# Patient Record
Sex: FEMALE | Race: WHITE | URBAN - METROPOLITAN AREA
[De-identification: names, ages, dates, MRNs, and addresses within clinical notes are randomized per-mention and may not be internally consistent; named-entity substitution may affect disease eponyms.]

---

## 2023-01-19 ENCOUNTER — WEB ENCOUNTER (OUTPATIENT)
Dept: URBAN - METROPOLITAN AREA CLINIC 19 | Facility: CLINIC | Age: 53
End: 2023-01-19

## 2023-01-19 ENCOUNTER — LAB OUTSIDE AN ENCOUNTER (OUTPATIENT)
Dept: URBAN - METROPOLITAN AREA CLINIC 19 | Facility: CLINIC | Age: 53
End: 2023-01-19

## 2023-01-19 ENCOUNTER — OFFICE VISIT (OUTPATIENT)
Dept: URBAN - METROPOLITAN AREA CLINIC 19 | Facility: CLINIC | Age: 53
End: 2023-01-19
Payer: COMMERCIAL

## 2023-01-19 VITALS
TEMPERATURE: 98.7 F | HEART RATE: 91 BPM | DIASTOLIC BLOOD PRESSURE: 76 MMHG | HEIGHT: 66 IN | WEIGHT: 205 LBS | BODY MASS INDEX: 32.95 KG/M2 | SYSTOLIC BLOOD PRESSURE: 128 MMHG

## 2023-01-19 DIAGNOSIS — R32 UNSPECIFIED URINARY INCONTINENCE: ICD-10-CM

## 2023-01-19 DIAGNOSIS — K58.0 IRRITABLE BOWEL SYNDROME WITH DIARRHEA: ICD-10-CM

## 2023-01-19 DIAGNOSIS — R15.9 FULL INCONTINENCE OF FECES: ICD-10-CM

## 2023-01-19 DIAGNOSIS — G45.8 ACUTE ANTERIOR CIRCULATION TIA: ICD-10-CM

## 2023-01-19 DIAGNOSIS — R13.19 ESOPHAGEAL DYSPHAGIA: ICD-10-CM

## 2023-01-19 DIAGNOSIS — K52.9 CHRONIC DIARRHEA: ICD-10-CM

## 2023-01-19 DIAGNOSIS — R19.8 GLOBUS SENSATION: ICD-10-CM

## 2023-01-19 PROCEDURE — 99204 OFFICE O/P NEW MOD 45 MIN: CPT | Performed by: STUDENT IN AN ORGANIZED HEALTH CARE EDUCATION/TRAINING PROGRAM

## 2023-01-19 NOTE — HPI-TODAY'S VISIT:
1/19/2023:  Judit: The pt is a 31 yo F who presents for symptoms of dysphagia and IBS-D.  Sx onset and duration:  "All her life".  Progressively worse in the last couple of years.  RUQ pain which is worse when she eats radiating laterally from the epigastric region to the ruq diagnally, which has been ongoing since her hernia repair. Dr. Hammond from [facility] did her hiatal hernia repair.  Has seen Dr. Rivers from [facility] previously for this, when the pain started and was told it wasn't related to hernia.  Also has IBS-D.  Antispasmodic was given and typically do not help.  Had another meds that she cannot swallow, so that she is not taking them.  Has also had to take 8-12 loperomide previously for the diarrhea.   Additional sx:  Fecal and urinary urgency.  Denies melena or hematochezia. Types of food:  Water and mashed potatoes get stuck often she reports.   ER visits for this: Not recently but multiple times for food impactions as a child. Symptoms of hay-fever or seasonal allergies or asthma: None. Smoking hx:  None Family members with similar issues: Adopted but she does not know the family history very well.    Other family hx:  Biological sister is the only one she knows of and she has had a h/o strokes.    Other concerns today:  On plavix for multiple TIAs.  Last TIA was a year ago.  Still has the gallbladder.  She has seen Dr. Lama in [place], Dr. Rivers in [place], [place].   present with her and provides supplemental hx.  Involved in her care.

## 2023-01-20 ENCOUNTER — WEB ENCOUNTER (OUTPATIENT)
Dept: URBAN - METROPOLITAN AREA CLINIC 19 | Facility: CLINIC | Age: 53
End: 2023-01-20

## 2023-01-20 ENCOUNTER — TELEPHONE ENCOUNTER (OUTPATIENT)
Dept: URBAN - METROPOLITAN AREA CLINIC 19 | Facility: CLINIC | Age: 53
End: 2023-01-20

## 2023-01-20 PROBLEM — 236071009: Status: ACTIVE | Noted: 2023-01-20

## 2023-01-20 PROBLEM — 78459008: Status: ACTIVE | Noted: 2023-01-20

## 2023-01-20 PROBLEM — 197125005: Status: ACTIVE | Noted: 2023-01-20

## 2023-01-20 PROBLEM — 1086911000119107: Status: ACTIVE | Noted: 2023-01-20

## 2023-01-20 PROBLEM — 267103008: Status: ACTIVE | Noted: 2023-01-20

## 2023-01-20 LAB
HS CRP: 3.2
IMMUNOGLOBULIN A: 340
INTERPRETATION: (no result)
SED RATE BY MODIFIED: 9
TISSUE TRANSGLUTAMINASE AB, IGA: <1
TSH W/REFLEX TO FT4: 2.1

## 2023-01-22 ENCOUNTER — TELEPHONE ENCOUNTER (OUTPATIENT)
Dept: URBAN - METROPOLITAN AREA CLINIC 19 | Facility: CLINIC | Age: 53
End: 2023-01-22

## 2023-01-26 ENCOUNTER — WEB ENCOUNTER (OUTPATIENT)
Dept: URBAN - METROPOLITAN AREA CLINIC 19 | Facility: CLINIC | Age: 53
End: 2023-01-26

## 2023-01-27 ENCOUNTER — WEB ENCOUNTER (OUTPATIENT)
Dept: URBAN - METROPOLITAN AREA CLINIC 19 | Facility: CLINIC | Age: 53
End: 2023-01-27

## 2023-01-27 RX ORDER — DIPHENOXYLATE HYDROCHLORIDE AND ATROPINE SULFATE 2.5; .025 MG/1; MG/1
1 TABLET AS NEEDED TABLET ORAL
Qty: 60 TABLET | Refills: 2 | OUTPATIENT
Start: 2023-01-30 | End: 2023-04-30

## 2023-01-27 RX ORDER — CHOLESTYRAMINE 4 G/9G
1 PACKET MIXED WITH WATER OR NON-CARBONATED DRINK POWDER, FOR SUSPENSION ORAL ONCE A DAY
Qty: 30 | Refills: 1 | OUTPATIENT
Start: 2023-01-30

## 2023-02-02 ENCOUNTER — TELEPHONE ENCOUNTER (OUTPATIENT)
Dept: URBAN - METROPOLITAN AREA CLINIC 96 | Facility: CLINIC | Age: 53
End: 2023-02-02

## 2023-03-02 PROBLEM — 40890009: Status: ACTIVE | Noted: 2023-01-20

## 2023-03-22 ENCOUNTER — OFFICE VISIT (OUTPATIENT)
Dept: URBAN - METROPOLITAN AREA SURGERY CENTER 31 | Facility: SURGERY CENTER | Age: 53
End: 2023-03-22

## 2023-03-22 RX ORDER — DIPHENOXYLATE HYDROCHLORIDE AND ATROPINE SULFATE 2.5; .025 MG/1; MG/1
1 TABLET AS NEEDED TABLET ORAL
Qty: 60 TABLET | Refills: 2 | Status: ACTIVE | COMMUNITY
Start: 2023-01-30 | End: 2023-04-30

## 2023-03-22 RX ORDER — CHOLESTYRAMINE 4 G/9G
1 PACKET MIXED WITH WATER OR NON-CARBONATED DRINK POWDER, FOR SUSPENSION ORAL ONCE A DAY
Qty: 30 | Refills: 1 | Status: ACTIVE | COMMUNITY
Start: 2023-01-30

## 2023-10-12 ENCOUNTER — DASHBOARD ENCOUNTERS (OUTPATIENT)
Age: 53
End: 2023-10-12

## 2023-10-12 ENCOUNTER — TELEPHONE ENCOUNTER (OUTPATIENT)
Dept: URBAN - METROPOLITAN AREA CLINIC 19 | Facility: CLINIC | Age: 53
End: 2023-10-12

## 2023-10-12 ENCOUNTER — LAB OUTSIDE AN ENCOUNTER (OUTPATIENT)
Dept: URBAN - METROPOLITAN AREA CLINIC 19 | Facility: CLINIC | Age: 53
End: 2023-10-12

## 2023-10-12 ENCOUNTER — OFFICE VISIT (OUTPATIENT)
Dept: URBAN - METROPOLITAN AREA CLINIC 19 | Facility: CLINIC | Age: 53
End: 2023-10-12
Payer: COMMERCIAL

## 2023-10-12 VITALS
HEART RATE: 91 BPM | HEIGHT: 66 IN | SYSTOLIC BLOOD PRESSURE: 138 MMHG | BODY MASS INDEX: 36.87 KG/M2 | DIASTOLIC BLOOD PRESSURE: 88 MMHG | WEIGHT: 229.4 LBS | TEMPERATURE: 97.4 F

## 2023-10-12 DIAGNOSIS — R13.19 ESOPHAGEAL DYSPHAGIA: ICD-10-CM

## 2023-10-12 DIAGNOSIS — R19.8 GLOBUS SENSATION: ICD-10-CM

## 2023-10-12 DIAGNOSIS — G45.8 ACUTE ANTERIOR CIRCULATION TIA: ICD-10-CM

## 2023-10-12 DIAGNOSIS — K58.0 IRRITABLE BOWEL SYNDROME WITH DIARRHEA: ICD-10-CM

## 2023-10-12 PROCEDURE — 99214 OFFICE O/P EST MOD 30 MIN: CPT | Performed by: STUDENT IN AN ORGANIZED HEALTH CARE EDUCATION/TRAINING PROGRAM

## 2023-10-12 RX ORDER — CHOLESTYRAMINE 4 G/9G
1 PACKET MIXED WITH WATER OR NON-CARBONATED DRINK POWDER, FOR SUSPENSION ORAL ONCE A DAY
Qty: 30 | Refills: 1 | Status: ON HOLD | COMMUNITY
Start: 2023-01-30

## 2023-10-12 RX ORDER — OMEPRAZOLE 40 MG/1
1 CAPSULE 30 MINUTES BEFORE MORNING MEAL CAPSULE, DELAYED RELEASE ORAL ONCE A DAY
Qty: 30 | OUTPATIENT
Start: 2023-10-12

## 2023-10-12 NOTE — HPI-TODAY'S VISIT:
10/12/2023: Judit: The patient is a 52-year-old female with longstanding GI history including IBS diarrhea, on Plavix for CVA who was originally seen in January of this year for reestablishment of care with GI.  Records from her previous GI was reviewed.  She was recommended an EGD for her upper GI symptoms.  This was pending Plavix hold.  She was also recommended lab work to rule out infection and malabsorption issues.  She appears to have been lost to follow-up since then and presents today to clinic.  She contiues to have severe reflux and epigastric pain.  Also has ongoing diarrhea.  Has not done the labs.  More nocturnal symptoms.  Niurka Perez, [facility] manages her Plavix. ============  1/19/2023:  Judit: The pt is a 51 yo F who presents for symptoms of dysphagia and IBS-D.  Sx onset and duration:  "All her life".  Progressively worse in the last couple of years.  RUQ pain which is worse when she eats radiating laterally from the epigastric region to the ruq diagnally, which has been ongoing since her hernia repair. Dr. Hammond from [facility] did her hiatal hernia repair.  Has seen Dr. Rivers from [facility] previously for this, when the pain started and was told it wasn't related to hernia.  Also has IBS-D.  Antispasmodic was given and typically do not help.  Had another meds that she cannot swallow, so that she is not taking them.  Has also had to take 8-12 loperomide previously for the diarrhea.   Additional sx:  Fecal and urinary urgency.  Denies melena or hematochezia. Types of food:  Water and mashed potatoes get stuck often she reports.   ER visits for this: Not recently but multiple times for food impactions as a child. Symptoms of hay-fever or seasonal allergies or asthma: None. Smoking hx:  None Family members with similar issues: Adopted but she does not know the family history very well.    Other family hx:  Biological sister is the only one she knows of and she has had a h/o strokes.    Other concerns today:  On plavix for multiple TIAs.  Last TIA was a year ago.  Still has the gallbladder.  She has seen Dr. Lama in [place], Dr. Rivers in [place], [place].   present with her and provides supplemental hx.  Involved in her care.

## 2023-10-18 ENCOUNTER — P2P PATIENT RECORD (OUTPATIENT)
Age: 53
End: 2023-10-18

## 2023-11-16 ENCOUNTER — TELEPHONE ENCOUNTER (OUTPATIENT)
Dept: URBAN - METROPOLITAN AREA CLINIC 19 | Facility: CLINIC | Age: 53
End: 2023-11-16

## 2023-11-20 ENCOUNTER — ERX REFILL RESPONSE (OUTPATIENT)
Dept: URBAN - METROPOLITAN AREA CLINIC 19 | Facility: CLINIC | Age: 53
End: 2023-11-20

## 2023-11-20 RX ORDER — OMEPRAZOLE 40 MG/1
1 CAPSULE 30 MINUTES BEFORE MORNING MEAL CAPSULE, DELAYED RELEASE ORAL ONCE A DAY
Qty: 30 | OUTPATIENT

## 2023-11-20 RX ORDER — OMEPRAZOLE 40 MG/1
TAKE ONE CAPSULE BY MOUTH ONE TIME DAILY 30 MINUTES BEFORE MORNING MEAL CAPSULE, DELAYED RELEASE ORAL
Qty: 30 CAPSULE | Refills: 5 | OUTPATIENT

## 2024-06-05 ENCOUNTER — ERX REFILL RESPONSE (OUTPATIENT)
Dept: URBAN - METROPOLITAN AREA CLINIC 19 | Facility: CLINIC | Age: 54
End: 2024-06-05

## 2024-06-05 RX ORDER — OMEPRAZOLE 40 MG/1
TAKE ONE CAPSULE BY MOUTH ONE TIME DAILY 30 MINUTES BEFORE MORNING MEAL CAPSULE, DELAYED RELEASE ORAL
Qty: 30 CAPSULE | Refills: 5 | OUTPATIENT

## 2024-09-25 ENCOUNTER — TELEPHONE ENCOUNTER (OUTPATIENT)
Dept: URBAN - METROPOLITAN AREA CLINIC 19 | Facility: CLINIC | Age: 54
End: 2024-09-25

## 2024-10-23 ENCOUNTER — LAB OUTSIDE AN ENCOUNTER (OUTPATIENT)
Dept: URBAN - METROPOLITAN AREA CLINIC 19 | Facility: CLINIC | Age: 54
End: 2024-10-23

## 2024-10-23 ENCOUNTER — OFFICE VISIT (OUTPATIENT)
Dept: URBAN - METROPOLITAN AREA CLINIC 19 | Facility: CLINIC | Age: 54
End: 2024-10-23
Payer: COMMERCIAL

## 2024-10-23 VITALS
TEMPERATURE: 97.9 F | HEIGHT: 66 IN | WEIGHT: 231.4 LBS | OXYGEN SATURATION: 98 % | BODY MASS INDEX: 37.19 KG/M2 | HEART RATE: 97 BPM | DIASTOLIC BLOOD PRESSURE: 88 MMHG | SYSTOLIC BLOOD PRESSURE: 132 MMHG

## 2024-10-23 DIAGNOSIS — R32 UNSPECIFIED URINARY INCONTINENCE: ICD-10-CM

## 2024-10-23 DIAGNOSIS — G45.8 ACUTE ANTERIOR CIRCULATION TIA: ICD-10-CM

## 2024-10-23 DIAGNOSIS — R15.9 FULL INCONTINENCE OF FECES: ICD-10-CM

## 2024-10-23 DIAGNOSIS — R19.8 GLOBUS SENSATION: ICD-10-CM

## 2024-10-23 DIAGNOSIS — K58.0 IRRITABLE BOWEL SYNDROME WITH DIARRHEA: ICD-10-CM

## 2024-10-23 DIAGNOSIS — R13.19 ESOPHAGEAL DYSPHAGIA: ICD-10-CM

## 2024-10-23 PROCEDURE — 99214 OFFICE O/P EST MOD 30 MIN: CPT | Performed by: STUDENT IN AN ORGANIZED HEALTH CARE EDUCATION/TRAINING PROGRAM

## 2024-10-23 RX ORDER — OMEPRAZOLE 40 MG/1
TAKE ONE CAPSULE BY MOUTH ONE TIME DAILY 30 MINUTES BEFORE MORNING MEAL CAPSULE, DELAYED RELEASE ORAL
Qty: 30 CAPSULE | Refills: 5 | Status: ACTIVE | COMMUNITY

## 2024-10-23 RX ORDER — OMEPRAZOLE 40 MG/1
1 CAPSULE 30 MINUTES BEFORE MORNING MEAL CAPSULE, DELAYED RELEASE ORAL ONCE A DAY
Qty: 30 | OUTPATIENT

## 2024-10-23 RX ORDER — CHOLESTYRAMINE 4 G/9G
1 SCOOP POWDER, FOR SUSPENSION ORAL ONCE A DAY
Qty: 378 | Refills: 3 | OUTPATIENT
Start: 2024-10-23

## 2024-10-23 RX ORDER — CHOLESTYRAMINE 4 G/9G
1 PACKET MIXED WITH WATER OR NON-CARBONATED DRINK POWDER, FOR SUSPENSION ORAL ONCE A DAY
Qty: 30 | Refills: 1 | Status: ON HOLD | COMMUNITY
Start: 2023-01-30

## 2024-10-23 NOTE — HPI-OTHER HISTORIES
10/12/2023: Judit: The patient is a 52-year-old female with longstanding GI history including IBS diarrhea, on Plavix for CVA who was originally seen in January of this year for reestablishment of care with GI.  Records from her previous GI was reviewed.  She was recommended an EGD for her upper GI symptoms.  This was pending Plavix hold.  She was also recommended lab work to rule out infection and malabsorption issues.  She appears to have been lost to follow-up since then and presents today to clinic.  She contiues to have severe reflux and epigastric pain.  Also has ongoing diarrhea.  Has not done the labs.  More nocturnal symptoms.  Niurka Perez, [facility] manages her Plavix. Plan:  EGD/colonoscopy with cardiac clearance. ============  1/19/2023: Judit: The pt is a 51 yo F who presents for symptoms of dysphagia and IBS-D.  Sx onset and duration: "All her life". Progressively worse in the last couple of years. RUQ pain which is worse when she eats radiating laterally from the epigastric region to the ruq diagnally, which has been ongoing since her hernia repair. Dr. Hammond from [facility] did her hiatal hernia repair. Has seen Dr. iRvers from [facility] previously for this, when the pain started and was told it wasn't related to hernia. Also has IBS-D. Antispasmodic was given and typically do not help. Had another meds that she cannot swallow, so that she is not taking them. Has also had to take 8-12 loperomide previously for the diarrhea. Additional sx: Fecal and urinary urgency. Denies melena or hematochezia. Types of food: Water and mashed potatoes get stuck often she reports. ER visits for this: Not recently but multiple times for food impactions as a child. Symptoms of hay-fever or seasonal allergies or asthma: None. Smoking hx: None Family members with similar issues: Adopted but she does not know the family history very well.  Other family hx: Biological sister is the only one she knows of and she has had a h/o strokes.  Other concerns today: On plavix for multiple TIAs. Last TIA was a year ago. Still has the gallbladder.  She has seen Dr. Lama in [place], Dr. Rivers in [place], [place].   present with her and provides supplemental hx. Involved in her care.

## 2024-10-23 NOTE — HPI-TODAY'S VISIT:
10/23/2024:  Judit: The pt is a 53 yo F on Plavix for CVA who returns to clinic for concerns of reflux that is worsening and nocturnal choking.  She has had difficulty swallowing.  We had seen her a year ago for this same issue.  We had recommended EGD/Colonoscopy and were trying to obtain clearance to hold her plavix.  Her neurologist who we had reached out to responded that they had not prescribed her plavix and could not clear her.  She was never scheduled as she was lost to follow up. She has come off the plavix for a while.  Reports post-prandial pain immediately after eating.  Has been taking a lot of loperomide.  Bloody stools with spotting.  Lot of blood in tstools.  Had to put toilet paper to stop the bleeding.  Has a h/o pancreatitis.  ====================== 13-Sep-2021 19:10

## 2024-10-24 LAB
A/G RATIO: 1.6
ALBUMIN: 4.2
ALKALINE PHOSPHATASE: 119
ALT (SGPT): 16
AST (SGOT): 10
BILIRUBIN, TOTAL: 0.4
BUN/CREATININE RATIO: (no result)
BUN: 8
CALCIUM: 9.5
CARBON DIOXIDE, TOTAL: 26
CHLORIDE: 104
CREATININE: 0.62
EGFR: 107
GLOBULIN, TOTAL: 2.7
GLUCOSE: 135
HEMATOCRIT: 41.2
HEMOGLOBIN: 13.1
LIPASE: 15
MCH: 27
MCHC: 31.8
MCV: 84.8
MPV: 10.6
PLATELET COUNT: 337
POTASSIUM: 4.5
PROTEIN, TOTAL: 6.9
RDW: 13.6
RED BLOOD CELL COUNT: 4.86
SODIUM: 139
WHITE BLOOD CELL COUNT: 21.1

## 2024-11-08 ENCOUNTER — OFFICE VISIT (OUTPATIENT)
Dept: URBAN - METROPOLITAN AREA SURGERY CENTER 31 | Facility: SURGERY CENTER | Age: 54
End: 2024-11-08

## 2024-11-08 ENCOUNTER — TELEPHONE ENCOUNTER (OUTPATIENT)
Dept: URBAN - METROPOLITAN AREA CLINIC 19 | Facility: CLINIC | Age: 54
End: 2024-11-08

## 2024-11-08 ENCOUNTER — CLAIMS CREATED FROM THE CLAIM WINDOW (OUTPATIENT)
Dept: URBAN - METROPOLITAN AREA SURGERY CENTER 31 | Facility: SURGERY CENTER | Age: 54
End: 2024-11-08
Payer: COMMERCIAL

## 2024-11-08 DIAGNOSIS — K22.2 ACQUIRED ESOPHAGEAL RING: ICD-10-CM

## 2024-11-08 DIAGNOSIS — K31.89 OTHER DISEASES OF STOMACH AND DUODENUM: ICD-10-CM

## 2024-11-08 DIAGNOSIS — Z53.8 PROCEDURE AND TREATMENT NOT CARRIED OUT FOR OTHER REASONS: ICD-10-CM

## 2024-11-08 PROCEDURE — 00731 ANES UPR GI NDSC PX NOS: CPT | Performed by: NURSE ANESTHETIST, CERTIFIED REGISTERED

## 2024-11-08 RX ORDER — CHOLESTYRAMINE 4 G/9G
1 SCOOP POWDER, FOR SUSPENSION ORAL ONCE A DAY
Qty: 378 | Refills: 3 | Status: ACTIVE | COMMUNITY
Start: 2024-10-23

## 2024-11-08 RX ORDER — OMEPRAZOLE 40 MG/1
1 CAPSULE 30 MINUTES BEFORE MORNING MEAL CAPSULE, DELAYED RELEASE ORAL ONCE A DAY
Qty: 30 | Status: ACTIVE | COMMUNITY

## 2024-11-08 RX ORDER — OMEPRAZOLE 40 MG/1
TAKE ONE CAPSULE BY MOUTH ONE TIME DAILY 30 MINUTES BEFORE MORNING MEAL CAPSULE, DELAYED RELEASE ORAL
Qty: 30 CAPSULE | Refills: 5 | Status: ACTIVE | COMMUNITY

## 2024-11-08 RX ORDER — CHOLESTYRAMINE 4 G/9G
1 PACKET MIXED WITH WATER OR NON-CARBONATED DRINK POWDER, FOR SUSPENSION ORAL ONCE A DAY
Qty: 30 | Refills: 1 | Status: ON HOLD | COMMUNITY
Start: 2023-01-30

## 2024-11-11 PROBLEM — 63305008: Status: ACTIVE | Noted: 2024-11-11

## 2024-11-18 ENCOUNTER — OFFICE VISIT (OUTPATIENT)
Dept: URBAN - METROPOLITAN AREA SURGERY CENTER 31 | Facility: SURGERY CENTER | Age: 54
End: 2024-11-18

## 2024-11-18 ENCOUNTER — CLAIMS CREATED FROM THE CLAIM WINDOW (OUTPATIENT)
Dept: URBAN - METROPOLITAN AREA SURGERY CENTER 31 | Facility: SURGERY CENTER | Age: 54
End: 2024-11-18
Payer: COMMERCIAL

## 2024-11-18 ENCOUNTER — TELEPHONE ENCOUNTER (OUTPATIENT)
Dept: URBAN - METROPOLITAN AREA CLINIC 19 | Facility: CLINIC | Age: 54
End: 2024-11-18

## 2024-11-18 DIAGNOSIS — Z53.8 PROCEDURE AND TREATMENT NOT CARRIED OUT FOR OTHER REASONS: ICD-10-CM

## 2024-11-18 DIAGNOSIS — K62.5 ANAL BLEEDING: ICD-10-CM

## 2024-11-18 DIAGNOSIS — R10.84 ABDOMINAL CRAMPING, GENERALIZED: ICD-10-CM

## 2024-11-18 DIAGNOSIS — K59.09 ACUTE CONSTIPATION IN CHILDHOOD: ICD-10-CM

## 2024-11-18 DIAGNOSIS — R19.7 ACUTE DIARRHEA: ICD-10-CM

## 2024-11-18 DIAGNOSIS — K62.5 RECTAL BLEEDING: ICD-10-CM

## 2024-11-18 PROCEDURE — 00811 ANES LWR INTST NDSC NOS: CPT | Performed by: NURSE ANESTHETIST, CERTIFIED REGISTERED

## 2024-11-18 PROCEDURE — 45330 DIAGNOSTIC SIGMOIDOSCOPY: CPT | Performed by: STUDENT IN AN ORGANIZED HEALTH CARE EDUCATION/TRAINING PROGRAM

## 2024-11-18 RX ORDER — CHOLESTYRAMINE 4 G/9G
1 SCOOP POWDER, FOR SUSPENSION ORAL ONCE A DAY
Qty: 378 | Refills: 3 | Status: ACTIVE | COMMUNITY
Start: 2024-10-23

## 2024-11-18 RX ORDER — OMEPRAZOLE 40 MG/1
TAKE ONE CAPSULE BY MOUTH ONE TIME DAILY 30 MINUTES BEFORE MORNING MEAL CAPSULE, DELAYED RELEASE ORAL
Qty: 30 CAPSULE | Refills: 5 | Status: ACTIVE | COMMUNITY

## 2024-11-18 RX ORDER — CHOLESTYRAMINE 4 G/9G
1 PACKET MIXED WITH WATER OR NON-CARBONATED DRINK POWDER, FOR SUSPENSION ORAL ONCE A DAY
Qty: 30 | Refills: 1 | Status: ON HOLD | COMMUNITY
Start: 2023-01-30

## 2024-11-18 RX ORDER — OMEPRAZOLE 40 MG/1
1 CAPSULE 30 MINUTES BEFORE MORNING MEAL CAPSULE, DELAYED RELEASE ORAL ONCE A DAY
Qty: 30 | Status: ACTIVE | COMMUNITY

## 2024-11-26 ENCOUNTER — WEB ENCOUNTER (OUTPATIENT)
Dept: URBAN - METROPOLITAN AREA CLINIC 19 | Facility: CLINIC | Age: 54
End: 2024-11-26

## 2024-11-27 ENCOUNTER — TELEPHONE ENCOUNTER (OUTPATIENT)
Dept: URBAN - METROPOLITAN AREA CLINIC 6 | Facility: CLINIC | Age: 54
End: 2024-11-27

## 2024-12-06 ENCOUNTER — TELEPHONE ENCOUNTER (OUTPATIENT)
Dept: URBAN - METROPOLITAN AREA CLINIC 5 | Facility: CLINIC | Age: 54
End: 2024-12-06

## 2024-12-06 ENCOUNTER — TELEPHONE ENCOUNTER (OUTPATIENT)
Dept: URBAN - METROPOLITAN AREA CLINIC 19 | Facility: CLINIC | Age: 54
End: 2024-12-06

## 2024-12-20 ENCOUNTER — ERX REFILL RESPONSE (OUTPATIENT)
Dept: URBAN - METROPOLITAN AREA CLINIC 19 | Facility: CLINIC | Age: 54
End: 2024-12-20

## 2024-12-20 RX ORDER — OMEPRAZOLE 40 MG/1
TAKE ONE CAPSULE BY MOUTH ONE TIME DAILY 30 MINUTES BEFORE MORNING MEAL FOR 30 DAYS CAPSULE, DELAYED RELEASE ORAL
Qty: 30 CAPSULE | Refills: 5 | OUTPATIENT

## 2024-12-20 RX ORDER — OMEPRAZOLE 40 MG/1
1 CAPSULE 30 MINUTES BEFORE MORNING MEAL CAPSULE, DELAYED RELEASE ORAL ONCE A DAY
Qty: 30 | OUTPATIENT

## 2025-01-30 ENCOUNTER — OFFICE VISIT (OUTPATIENT)
Dept: URBAN - METROPOLITAN AREA CLINIC 19 | Facility: CLINIC | Age: 55
End: 2025-01-30